# Patient Record
(demographics unavailable — no encounter records)

---

## 2025-02-03 NOTE — ASSESSMENT
[FreeTextEntry1] : 81F hx JAK2+ ET, refractory platelet count to hydroxyurea.  Doses could not be titrated up to effect due to side effects of nausea or vomiting. After discussion of her various options, we had elected to start anagrelide 0.5mg BID. However, before long, patient had considered that the hydrea was easier to take for her after all, switched back to Hydrea 500mg BID then increased this to 1000mg/500mg (alternates every day) due to lack of response. Her worsening leukocytosis and anemia along with her persistent pruritis / deep burning sensation are suspicious for post-ET myelofibrosis.  Will schedule patient for bone marrow biopsy and aspiration as soon as possible to evaluate for myelofibrosis. Continue with the current regimen of alternating hydroxyurea 500mg qd / 1000 mg qd for now.  RTC 1 month to discuss results of BMBx / aspiration.  Patient seen and examined with Dr. Rush.   Adi Cadena M.D. Hematology/Oncology Fellow PGY-6 [facility]  I discussed this patient in a pre-clinic session with the fellow including review of clinical status and last labs.  I also saw the patient and discussed history, completed an exam and discussed the plan together with the fellow.  Total time spent today on this patient   36   minutes.  This is an 81 year old women with a history of ET, Hg decreasing despite good platelet control for some time now, WBC elevated Suspect progression to  myelofibrosis. Recommend bone marrow biopsy continue  undue hydrea 1000/500mg alternating doses for now.

## 2025-02-03 NOTE — HISTORY OF PRESENT ILLNESS
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 Initially seen by Dr. Ofe Ricci followed by Dr. Gibbons.   in [place]. Patient referred back to [facility] for 2nd opinion on ET.  Patient had difficulty maintaining cytoreduction on either hydroxyurea or anagrelide by history.  Platelets and Hg well controlled when she is on the medications. Patient was lost to follow up at the end of 2022.  Returned to his PCP office this past week.   WBC was noted to be 35k/uL platelets in the 560's.  MCV 92 suggesting patient was not taking  the hydroxyurea 1500mg daily.  Patient reported to Dr. Roper that she was having increasingly severe pruritus, particularly while taking a shower.  I had called patient on that week explaining that she needs to follow up. in person given the WBC, needed to review for transition to MF or leukemia.   Patient did claim to take the Hydrea the whole time, however I pointed out that I do have evidence that she had not given the lack of increase in MCV.  We know by history that it goes into the 120's in MCV when she is indeed taking it.   [de-identified] : Following up for JAK2+ ET. She reports an intermittent burning sensation that is much deeper than the skin. She has been taking hydroxyurea 500mg alternating 1 tab qd and 1 tab BID. She was already taking ASA 81mg qd well before developing symptoms of ET. Today 1/30/25 WBC 24.28, Hgb 10.8, Plt 501.

## 2025-02-03 NOTE — REVIEW OF SYSTEMS
[Diarrhea: Grade 0] : Diarrhea: Grade 0 [Negative] : Allergic/Immunologic [de-identified] : Intermittent diffuse deep burning sensation (not superficial)

## 2025-02-03 NOTE — ASSESSMENT
[FreeTextEntry1] : 81F hx JAK2+ ET, refractory platelet count to hydroxyurea.  Doses could not be titrated up to effect due to side effects of nausea or vomiting. After discussion of her various options, we had elected to start anagrelide 0.5mg BID. However, before long, patient had considered that the hydrea was easier to take for her after all, switched back to Hydrea 500mg BID then increased this to 1000mg/500mg (alternates every day) due to lack of response. Her worsening leukocytosis and anemia along with her persistent pruritis / deep burning sensation are suspicious for post-ET myelofibrosis.  Will schedule patient for bone marrow biopsy and aspiration as soon as possible to evaluate for myelofibrosis. Continue with the current regimen of alternating hydroxyurea 500mg qd / 1000 mg qd for now.  RTC 1 month to discuss results of BMBx / aspiration.  Patient seen and examined with Dr. Rush.   Adi Cadena M.D. Hematology/Oncology Fellow PGY-6 [facility]  I discussed this patient in a pre-clinic session with the fellow including review of clinical status and last labs.  I also saw the patient and discussed history, completed an exam and discussed the plan together with the fellow.  Total time spent today on this patient   36   minutes.  This is an 81 year old women with a history of ET, Hg decreasing despite good platelet control for some time now, WBC elevated Suspect progression to  myelofibrosis. Recommend bone marrow biopsy continue  undue hydrea 1000/500mg alternating doses for now.

## 2025-02-03 NOTE — REVIEW OF SYSTEMS
[Diarrhea: Grade 0] : Diarrhea: Grade 0 [Negative] : Allergic/Immunologic [de-identified] : Intermittent diffuse deep burning sensation (not superficial)

## 2025-02-03 NOTE — HISTORY OF PRESENT ILLNESS
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 Initially seen by Dr. Ofe Ricci followed by Dr. Gibbons.   in [place]. Patient referred back to [facility] for 2nd opinion on ET.  Patient had difficulty maintaining cytoreduction on either hydroxyurea or anagrelide by history.  Platelets and Hg well controlled when she is on the medications. Patient was lost to follow up at the end of 2022.  Returned to his PCP office this past week.   WBC was noted to be 35k/uL platelets in the 560's.  MCV 92 suggesting patient was not taking  the hydroxyurea 1500mg daily.  Patient reported to Dr. Roper that she was having increasingly severe pruritus, particularly while taking a shower.  I had called patient on that week explaining that she needs to follow up. in person given the WBC, needed to review for transition to MF or leukemia.   Patient did claim to take the Hydrea the whole time, however I pointed out that I do have evidence that she had not given the lack of increase in MCV.  We know by history that it goes into the 120's in MCV when she is indeed taking it.   [de-identified] : Following up for JAK2+ ET. She reports an intermittent burning sensation that is much deeper than the skin. She has been taking hydroxyurea 500mg alternating 1 tab qd and 1 tab BID. She was already taking ASA 81mg qd well before developing symptoms of ET. Today 1/30/25 WBC 24.28, Hgb 10.8, Plt 501.

## 2025-02-03 NOTE — ASSESSMENT
[FreeTextEntry1] : 81F hx JAK2+ ET, refractory platelet count to hydroxyurea.  Doses could not be titrated up to effect due to side effects of nausea or vomiting. After discussion of her various options, we had elected to start anagrelide 0.5mg BID. However, before long, patient had considered that the hydrea was easier to take for her after all, switched back to Hydrea 500mg BID then increased this to 1000mg/500mg (alternates every day) due to lack of response. Her worsening leukocytosis and anemia along with her persistent pruritis / deep burning sensation are suspicious for post-ET myelofibrosis.  Will schedule patient for bone marrow biopsy and aspiration as soon as possible to evaluate for myelofibrosis. Continue with the current regimen of alternating hydroxyurea 500mg qd / 1000 mg qd for now.  RTC 1 month to discuss results of BMBx / aspiration.  Patient seen and examined with Dr. Rush.   Adi Cadena M.D. Hematology/Oncology Fellow PGY-6 [facility]  I discussed this patient in a pre-clinic session with the fellow including review of clinical status and last labs.  I also saw the patient and discussed history, completed an exam and discussed the plan together with the fellow.  Total time spent today on this patient   36   minutes.  This is an 81 year old women with a history of ET, Hg decreasing despite good platelet control for some time now, WBC elevated Suspect progression to  myelofibrosis. Recommend bone marrow biopsy continue  undue hydrea 1000/500mg alternating doses for now.

## 2025-02-03 NOTE — HISTORY OF PRESENT ILLNESS
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 Initially seen by Dr. Ofe Ricci followed by Dr. Gibbons.   in [place]. Patient referred back to [facility] for 2nd opinion on ET.  Patient had difficulty maintaining cytoreduction on either hydroxyurea or anagrelide by history.  Platelets and Hg well controlled when she is on the medications. Patient was lost to follow up at the end of 2022.  Returned to his PCP office this past week.   WBC was noted to be 35k/uL platelets in the 560's.  MCV 92 suggesting patient was not taking  the hydroxyurea 1500mg daily.  Patient reported to Dr. Roper that she was having increasingly severe pruritus, particularly while taking a shower.  I had called patient on that week explaining that she needs to follow up. in person given the WBC, needed to review for transition to MF or leukemia.   Patient did claim to take the Hydrea the whole time, however I pointed out that I do have evidence that she had not given the lack of increase in MCV.  We know by history that it goes into the 120's in MCV when she is indeed taking it.   [de-identified] : Following up for JAK2+ ET. She reports an intermittent burning sensation that is much deeper than the skin. She has been taking hydroxyurea 500mg alternating 1 tab qd and 1 tab BID. She was already taking ASA 81mg qd well before developing symptoms of ET. Today 1/30/25 WBC 24.28, Hgb 10.8, Plt 501.

## 2025-02-03 NOTE — REVIEW OF SYSTEMS
[Diarrhea: Grade 0] : Diarrhea: Grade 0 [Negative] : Allergic/Immunologic [de-identified] : Intermittent diffuse deep burning sensation (not superficial)

## 2025-02-06 NOTE — REASON FOR VISIT
[Bone Marrow Biopsy] : bone marrow biopsy [Bone Marrow Aspiration] : bone marrow aspiration [Family Member] : family member [FreeTextEntry2] : R/O Myelofibrosis

## 2025-02-06 NOTE — PROCEDURE
[Bone Marrow Biopsy] : bone marrow biopsy [Bone Marrow Aspiration] : bone marrow aspiration  [Patient] : the patient [Correct positioning] : correct positioning [Prone] : prone [The left posterior iliac crest was prepped with betadine and draped, using sterile technique.] : The left posterior iliac crest was prepped with betadine and draped, using sterile technique. [Lidocaine was injected and into the periosteum overlying the site.] : Lidocaine was injected and into the periosteum overlying the site. [Aspirate] : aspirate [Cytogenetics] : cytogenetics [FISH] : FISH [Other ___] : [unfilled] [Biopsy] : biopsy [Flow Cytometry] : flow cytometry [] : The patient was instructed to remove the bandage the following AM. The patient may bathe. Acetaminophen may be taken for discomfort, as per package directions.If there are any other problems, the patient was instructed to call the office. The patient verbalized understanding, and is aware of the office contact numbers. [FreeTextEntry1] : R/O Myelofibrosis [FreeTextEntry2] : WBC 27.9 Hgb 10.9 Hct 33.5 Plts 314K  BM biopsy and aspirate done.  Samples sent to [facility].  Lidocaine-2 %-6 cc's used for local anesthesia.

## 2025-03-09 NOTE — HISTORY OF PRESENT ILLNESS
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 Initially seen by Dr. Ofe Ricci followed by Dr. Gibbons.   in [place]. Patient referred back to [facility] for 2nd opinion on ET.  Patient had difficulty maintaining cytoreduction on either hydroxyurea or anagrelide by history.  Platelets and Hg well controlled when she is on the medications. Patient was lost to follow up at the end of 2022.  Returned to his PCP office this past week.   WBC was noted to be 35k/uL platelets in the 560's.  MCV 92 suggesting patient was not taking  the hydroxyurea 1500mg daily.  Patient reported to Dr. Roper that she was having increasingly severe pruritus, particularly while taking a shower.  I had called patient on that week explaining that she needs to follow up. in person given the WBC, needed to review for transition to MF or leukemia.   Patient did claim to take the Hydrea the whole time, however I pointed out that I do have evidence that she had not given the lack of increase in MCV.  We know by history that it goes into the 120's in MCV when she is indeed taking it.   [de-identified] : Following up for JAK2+ ET. She reports an intermittent burning sensation that is much deeper than the skin. She has been taking hydroxyurea 500mg alternating 1 tab qd and 1 tab BID. She was already taking ASA 81mg qd well before developing symptoms of ET. Today 3/6/25 CBC: WBC: 26.92, Hgb 11.0, .   (3) walks occasionally

## 2025-03-09 NOTE — REVIEW OF SYSTEMS
[Diarrhea: Grade 0] : Diarrhea: Grade 0 [Negative] : Allergic/Immunologic [Night Sweats] : night sweats [Recent Change In Weight] : ~T recent weight change [de-identified] : +intense pruritus.  [de-identified] : Intermittent diffuse deep burning sensation (not superficial)

## 2025-03-09 NOTE — REVIEW OF SYSTEMS
[Diarrhea: Grade 0] : Diarrhea: Grade 0 [Negative] : Allergic/Immunologic [Night Sweats] : night sweats [Recent Change In Weight] : ~T recent weight change [de-identified] : +intense pruritus.  [de-identified] : Intermittent diffuse deep burning sensation (not superficial)

## 2025-03-09 NOTE — ASSESSMENT
[FreeTextEntry1] : 81F hx JAK2+ ET, refractory platelet count to hydroxyurea.  Doses could not be titrated up to effect due to side effects of nausea or vomiting. After discussion of her various options, we had elected to start anagrelide 0.5mg BID. However, before long, patient had considered that the hydrea was easier to take for her after all, switched back to Hydrea 500mg BID then increased this to 1000mg/500mg (alternates every day) due to lack of response. Her worsening leukocytosis and anemia along with her persistent pruritis / deep burning sensation are suspicious for post-ET myelofibrosis. Bone Marrow biopsy from 2/6/25 show MF grade 2-3. Patient has constitutional symptoms of early satiety. Will start the patient on fedratinib BID (patient to be prescribed BID but will start qdaily for the first month and slowly uptitrate to BID) while continuing with the current regimen of alternating hydroxyurea 500mg qd / 1000 mg qd for now. Will f/u in 1 month, if counts are stable at that time we will consider tapering off of the hydrea slowly while checking cbcs and lfts monthly.     Patient seen and examined with Dr. Rush.   Dakotah Elliott MD. PGY-V Hematology Oncology Fellow   I discussed this patient in a pre-clinic session with the fellow including review of clinical status and last labs.  I also saw the patient and discussed history, completed an exam and discussed the plan together with the fellow.  Total time spent today on this patient   37   minutes.  This i an 81 year old woman with a history of ET, history of elevated platelet counts that have since been well controlled after a period of poor compliance prior to 2021 when we had started seeing her.  Patient has been mostly well controlled since April 2021.  Most recently we have seen her hg decrease to 10.9-11.0g/dkl accompanied by increased itching through December into January 2025. This is an entirely new finding.  Suspected patient had progressed to Myelofibrosis without splenomegaly.  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  Recommend patient start Fedrapinib 100mg 2 tablets daily for vasomotor symptoms of the myelofibrosis.  CBC stable right now, apers the effects are mostly vasomotor with pruritis, anemia is asymptmatic.

## 2025-03-09 NOTE — HISTORY OF PRESENT ILLNESS
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 Initially seen by Dr. Ofe Ricci followed by Dr. Gibbons.   in [place]. Patient referred back to [facility] for 2nd opinion on ET.  Patient had difficulty maintaining cytoreduction on either hydroxyurea or anagrelide by history.  Platelets and Hg well controlled when she is on the medications. Patient was lost to follow up at the end of 2022.  Returned to his PCP office this past week.   WBC was noted to be 35k/uL platelets in the 560's.  MCV 92 suggesting patient was not taking  the hydroxyurea 1500mg daily.  Patient reported to Dr. Roper that she was having increasingly severe pruritus, particularly while taking a shower.  I had called patient on that week explaining that she needs to follow up. in person given the WBC, needed to review for transition to MF or leukemia.   Patient did claim to take the Hydrea the whole time, however I pointed out that I do have evidence that she had not given the lack of increase in MCV.  We know by history that it goes into the 120's in MCV when she is indeed taking it.   [de-identified] : Following up for JAK2+ ET. She reports an intermittent burning sensation that is much deeper than the skin. She has been taking hydroxyurea 500mg alternating 1 tab qd and 1 tab BID. She was already taking ASA 81mg qd well before developing symptoms of ET. Today 3/6/25 CBC: WBC: 26.92, Hgb 11.0, .

## 2025-04-15 NOTE — HISTORY OF PRESENT ILLNESS
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 Initially seen by Dr. Ofe Ricci followed by Dr. Gibbons.   in [place]. [facility]erred back to [facility] for [facility] opinion on ET.  Patient had difficulty maintaining cytoreduction on either hydroxyurea or anagrelide by history.  Platelets and Hg well controlled when she is on the medications.    Joselito was lost to follow up at the end of 2022.  Returned to his PCP office this past week.   WBC was noted to be 35k/ul platets in the 560's.  MCV 92 suggesting lex was not taking  the hydroxyurea 1500mg daily.  Patient reported to Dr. Roper that she was having increasingly severe pruritus, particularly while taking ia shower.  I had called patint on that week explaining that she needs to follow up. in person given the WBC, needed to review for transition to MF or leukemia.   Lex did claim to take the hydrea the whole time, however I pointed out that I do have evidence that she had not given the lack of increase in MCV.  We know by maryuri that it goes into the 120's in MCV when she is indeed taking it.   [de-identified] : Patient still complains of itching and body pains.  Took 3-4 weeks just to get the federation 100mg daily, but patient did get the medication but she actually believes the itching is worse now.  Has been on the federapinib 100mg for about 3 weeks.    Patient feels burning in the arms and body especially in the shower.  Patient remains on the hydroxyurea.   At the very least the Faverani dopes not hurt the stomach though she notices increased gas.   Hg 10.9g/dl today WBC 15.38.   Platelets 547.    patient sister was using a triamcinolone medication for itching, Patient though she could try it. Would surmise that it probably wouldn't t hurt but this is more for eczema rathe than pruritic from the ET.

## 2025-04-15 NOTE — REVIEW OF SYSTEMS
[Diarrhea: Grade 0] : Diarrhea: Grade 0 [Negative] : Allergic/Immunologic [de-identified] : Pruritis

## 2025-04-15 NOTE — REVIEW OF SYSTEMS
[Diarrhea: Grade 0] : Diarrhea: Grade 0 [Negative] : Allergic/Immunologic [de-identified] : Pruritis

## 2025-04-15 NOTE — HISTORY OF PRESENT ILLNESS
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 Initially seen by Dr. Ofe Ricci followed by Dr. Gibbons.   in [place]. [facility]nreferred back to [facility] for 2[facility] opinion on ET.  Patient had difficulty maintaining cytoreduction on either hydroxyurea or anagrelide by history.  Platelets and Hg well controlled when she is on the medications.    Joselito was lost to follow up at the end of 2022.  Returned to his PCP office this past week.   WBC was noted to be 35k/ul platets in the 560's.  MCV 92 suggesting lex was not taking  the hydroxyurea 1500mg daily.  Patient reported to Dr. Roper that she was having increasingly severe pruritus, particularly while taking ia shower.  I had called patint on that week explaining that she needs to follow up. in person given the WBC, needed to review for transition to MF or leukemia.   Lex did claim to take the hydrea the whole time, however I pointed out that I do have evidence that she had not given the lack of increase in MCV.  We know by maryuri that it goes into the 120's in MCV when she is indeed taking it.   [de-identified] : Patient still complains of itching and body pains.  Took 3-4 weeks just to get the federation 100mg daily, but patient did get the medication but she actually believes the itching is worse now.  Has been on the federapinib 100mg for about 3 weeks.    Patient feels burning in the arms and body especially in the shower.  Patient remains on the hydroxyurea.   At the very least the Faverani dopes not hurt the stomach though she notices increased gas.   Hg 10.9g/dl today WBC 15.38.   Platelets 547.    patient sister was using a triamcinolone medication for itching, Patient though she could try it. Would surmise that it probably wouldn't t hurt but this is more for eczema rathe than pruritic from the ET.

## 2025-04-15 NOTE — ASSESSMENT
[FreeTextEntry1] : 81F hx JAK2+ ET, refractory platelet count to hydroxyurea.  Doses could not be titrated up to effect due to side effects of nausea or vomiting. After discussion of her various options, we had elected to start anagrelide 0.5mg BID. However, before long, patient had considered that the hydrea was easier to take for her after all, switched back to Hydrea 500mg BID then increased this to 1000mg/500mg (alternates every day) due to lack of response. Her worsening leukocytosis and anemia along with her persistent pruritis / deep burning sensation are suspicious for post-ET myelofibrosis. Bone Marrow biopsy from 2/6/25 show MF grade 2-3. Patient has constitutional symptoms of early satiety. Patient on federation which she did receive, however is taking 100mg daily instead of 200mg daily. She experiences pruritic again which is the same complaint that she was complaining of for a while now. She implicates the new fedratinib  in this. Patient requesting a switch back to anagrelide.   I pointed out to patient  that we have gone through multiple rounds of switching from hydrea,  to anagrelide, back to hydrea and then to fedratinib, the entire time she was making the same complaints and attributing them to the medication two whole time.  IN essence her symptoms are from the myelofibrosis itself, however patient always identifies this as a side effect of the medications he was on and we have switched back and fourth many times without any relief of her symptoms. Recommend strongly that she should take the fedratpinib 200mg daily. Patient was able to express this back to me and repeat this back. She was never really complaint with any of the above medications recommended in the past therefore its more of a compliance issue rather than a choice of medications issue.    Will follow up in 1 month.  Spent > 40 minutes in direct patient care and addressed all questions and concerns.  >50% of this time was in direct face to face contact with patient during exam and counseling.

## 2025-05-11 NOTE — ASSESSMENT
[FreeTextEntry1] : 81F hx JAK2+ ET, refractory platelet count to hydroxyurea.  Doses could not be titrated up to effect due to side effects of nausea or vomiting. After discussion of her various options, we had elected to start anagrelide 0.5mg BID. However, before long, patient had considered that the hydrea was easier to take for her after all, switched back to Hydrea 500mg BID then increased this to 1000mg/500mg (alternates every day) due to lack of response. Her worsening leukocytosis and anemia along with her persistent pruritis / deep burning sensation are suspicious for post-ET myelofibrosis. Bone Marrow biopsy from 2/6/25 show MF grade 2-3. Patient has constitutional symptoms of early satiety. Patient on federation which she did receive, however is taking 100mg daily instead of 200mg daily. She experiences pruritic again which is the same complaint that she was complaining of for a while now. She implicates the new fedratinib  in this. Patient requesting a switch back to anagrelide.   She is now adherent to fedratinib 200 mg qd but is becoming anemic to Hgb 8.3 g/dL due to the fedratinib. Plt 267 K/uL today, so will discontinue hydroxyurea.  RTC 1 month to monitor anemia closely.  Patient seen and examined with Dr. Rush.   Adi Cadena M.D. Hematology/Oncology Fellow PGY-6 [facility]  I discussed this patient in a pre-clinic session with the fellow including review of clinical status and last labs.  I also saw the patient and discussed history, completed an exam and discussed the plan together with the fellow.  Total time spent today on this patient  35    minutes.  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 HG starting to come down tiwh t hydrea nad federapinib. recocmedn discontinue the hydrea, contineu fedrapinib 100mg 2 tablets daily, follow up in 1 months for anemia.

## 2025-05-11 NOTE — HISTORY OF PRESENT ILLNESS
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 Initially seen by Dr. Ofe Ricci followed by Dr. Gibbons.   in [place]. [facility]nreferred back to [facility] for 2nd opinion on ET.  Patient had difficulty maintaining cytoreduction on either hydroxyurea or anagrelide by history.  Platelets and Hg well controlled when she is on the medications.    Joselito was lost to follow up at the end of 2022.  Returned to his PCP office this past week.   WBC was noted to be 35k/ul platets in the 560's.  MCV 92 suggesting lex was not taking  the hydroxyurea 1500mg daily.  Patient reported to Dr. Roper that she was having increasingly severe pruritus, particularly while taking ia shower.  I had called patint on that week explaining that she needs to follow up. in person given the WBC, needed to review for transition to MF or leukemia.   Lex did claim to take the hydrea the whole time, however I pointed out that I do have evidence that she had not given the lack of increase in MCV.  We know by maryuri that it goes into the 120's in MCV when she is indeed taking it.   [de-identified] : Following up for post-ET MF. Patient still complains of itching and muscle cramps. Took 3-4 weeks just to get the fedratinib 200 mg daily. Patient remains on the hydroxyurea. Hgb 8.3 g/dL and platelets 267 K/uL.  Pruritis is 90% better after starting the fedratinib. Hgb 9.9 g/dL though, probably treatment related. Given fedratinib helped her pruritis, will stop the hydroxyurea first before decreasing fedratinib. Follow up in 1 month for anemia.   Does notice cramping, check BMP and magnesium.    Patient did notice some shakiness and weakness, suspect its anemia related and should recover by stopping the hydroxyurea.

## 2025-05-11 NOTE — HISTORY OF PRESENT ILLNESS
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 Initially seen by Dr. Ofe Ricci followed by Dr. Gibbons.   in [place] Michaelnreferred back to [facility] for 2nd opinion on ET.  Patient had difficulty maintaining cytoreduction on either hydroxyurea or anagrelide by history.  Platelets and Hg well controlled when she is on the medications.    Joselito was lost to follow up at the end of 2022.  Returned to his PCP office this past week.   WBC was noted to be 35k/ul platets in the 560's.  MCV 92 suggesting lex was not taking  the hydroxyurea 1500mg daily.  Patient reported to Dr. Roper that she was having increasingly severe pruritus, particularly while taking ia shower.  I had called patint on that week explaining that she needs to follow up. in person given the WBC, needed to review for transition to MF or leukemia.   Lex did claim to take the hydrea the whole time, however I pointed out that I do have evidence that she had not given the lack of increase in MCV.  We know by maryuri that it goes into the 120's in MCV when she is indeed taking it.   [de-identified] : Following up for post-ET MF. Patient still complains of itching and muscle cramps. Took 3-4 weeks just to get the fedratinib 200 mg daily. Patient remains on the hydroxyurea. Hgb 8.3 g/dL and platelets 267 K/uL.  Pruritis is 90% better after starting the fedratinib. Hgb 9.9 g/dL though, probably treatment related. Given fedratinib helped her pruritis, will stop the hydroxyurea first before decreasing fedratinib. Follow up in 1 month for anemia.   Does notice cramping, check BMP and magnesium.    Patient did notice some shakiness and weakness, suspect its anemia related and should recover by stopping the hydroxyurea.

## 2025-05-11 NOTE — REVIEW OF SYSTEMS
[Diarrhea: Grade 0] : Diarrhea: Grade 0 [Negative] : Allergic/Immunologic [de-identified] : Pruritis

## 2025-05-11 NOTE — HISTORY OF PRESENT ILLNESS
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 Initially seen by Dr. Ofe Ricci followed by Dr. Gibbons.   in [place]. Michaelnreferred back to [facility] for 2nd opinion on ET.  Patient had difficulty maintaining cytoreduction on either hydroxyurea or anagrelide by history.  Platelets and Hg well controlled when she is on the medications.    Joselito was lost to follow up at the end of 2022.  Returned to his PCP office this past week.   WBC was noted to be 35k/ul platets in the 560's.  MCV 92 suggesting lex was not taking  the hydroxyurea 1500mg daily.  Patient reported to Dr. Roper that she was having increasingly severe pruritus, particularly while taking ia shower.  I had called patint on that week explaining that she needs to follow up. in person given the WBC, needed to review for transition to MF or leukemia.   Lex did claim to take the hydrea the whole time, however I pointed out that I do have evidence that she had not given the lack of increase in MCV.  We know by maryuri that it goes into the 120's in MCV when she is indeed taking it.   [de-identified] : Following up for post-ET MF. Patient still complains of itching and muscle cramps. Took 3-4 weeks just to get the fedratinib 200 mg daily. Patient remains on the hydroxyurea. Hgb 8.3 g/dL and platelets 267 K/uL.  Pruritis is 90% better after starting the fedratinib. Hgb 9.9 g/dL though, probably treatment related. Given fedratinib helped her pruritis, will stop the hydroxyurea first before decreasing fedratinib. Follow up in 1 month for anemia.   Does notice cramping, check BMP and magnesium.    Patient did notice some shakiness and weakness, suspect its anemia related and should recover by stopping the hydroxyurea.

## 2025-05-11 NOTE — REVIEW OF SYSTEMS
[Diarrhea: Grade 0] : Diarrhea: Grade 0 [Negative] : Allergic/Immunologic [de-identified] : Pruritis

## 2025-05-11 NOTE — ASSESSMENT
Isatu Nelson was seen and treated in our emergency department on 8/4/2022. He may return to work on 08/08/2022. Feng Carpio was originally seen on 8/2 for an abscess to his leg. He needed to return the emergency department on 8/4 for wound reevaluation. Recommending time off until Monday, 8th. If you have any questions or concerns, please don't hesitate to call.       Moraima Laws MD [FreeTextEntry1] : 81F hx JAK2+ ET, refractory platelet count to hydroxyurea.  Doses could not be titrated up to effect due to side effects of nausea or vomiting. After discussion of her various options, we had elected to start anagrelide 0.5mg BID. However, before long, patient had considered that the hydrea was easier to take for her after all, switched back to Hydrea 500mg BID then increased this to 1000mg/500mg (alternates every day) due to lack of response. Her worsening leukocytosis and anemia along with her persistent pruritis / deep burning sensation are suspicious for post-ET myelofibrosis. Bone Marrow biopsy from 2/6/25 show MF grade 2-3. Patient has constitutional symptoms of early satiety. Patient on federation which she did receive, however is taking 100mg daily instead of 200mg daily. She experiences pruritic again which is the same complaint that she was complaining of for a while now. She implicates the new fedratinib  in this. Patient requesting a switch back to anagrelide.   She is now adherent to fedratinib 200 mg qd but is becoming anemic to Hgb 8.3 g/dL due to the fedratinib. Plt 267 K/uL today, so will discontinue hydroxyurea.  RTC 1 month to monitor anemia closely.  Patient seen and examined with Dr. Rush.   Adi Cadena M.D. Hematology/Oncology Fellow PGY-6 [facility]  I discussed this patient in a pre-clinic session with the fellow including review of clinical status and last labs.  I also saw the patient and discussed history, completed an exam and discussed the plan together with the fellow.  Total time spent today on this patient  35    minutes.  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 HG starting to come down tiwh t hydrea nad federapinib. recocmedn discontinue the hydrea, contineu fedrapinib 100mg 2 tablets daily, follow up in 1 months for anemia.

## 2025-05-11 NOTE — REVIEW OF SYSTEMS
[Diarrhea: Grade 0] : Diarrhea: Grade 0 [Negative] : Allergic/Immunologic [de-identified] : Pruritis

## 2025-05-11 NOTE — ASSESSMENT
[FreeTextEntry1] : 81F hx JAK2+ ET, refractory platelet count to hydroxyurea.  Doses could not be titrated up to effect due to side effects of nausea or vomiting. After discussion of her various options, we had elected to start anagrelide 0.5mg BID. However, before long, patient had considered that the hydrea was easier to take for her after all, switched back to Hydrea 500mg BID then increased this to 1000mg/500mg (alternates every day) due to lack of response. Her worsening leukocytosis and anemia along with her persistent pruritis / deep burning sensation are suspicious for post-ET myelofibrosis. Bone Marrow biopsy from 2/6/25 show MF grade 2-3. Patient has constitutional symptoms of early satiety. Patient on federation which she did receive, however is taking 100mg daily instead of 200mg daily. She experiences pruritic again which is the same complaint that she was complaining of for a while now. She implicates the new fedratinib  in this. Patient requesting a switch back to anagrelide.   She is now adherent to fedratinib 200 mg qd but is becoming anemic to Hgb 8.3 g/dL due to the fedratinib. Plt 267 K/uL today, so will discontinue hydroxyurea.  RTC 1 month to monitor anemia closely.  Patient seen and examined with Dr. Rush.   Adi Cadena M.D. Hematology/Oncology Fellow PGY-6 [facility]  I discussed this patient in a pre-clinic session with the fellow including review of clinical status and last labs.  I also saw the patient and discussed history, completed an exam and discussed the plan together with the fellow.  Total time spent today on this patient  35    minutes.  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 HG starting to come down tiwh t hydrea nad federapinib. recocmedn discontinue the hydrea, contineu fedrapinib 100mg 2 tablets daily, follow up in 1 months for anemia.

## 2025-06-12 NOTE — HISTORY OF PRESENT ILLNESS
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 Initially seen by Dr. Ofe Ricci followed by Dr. Gibbons.   in [place]. Michaelnreferred back to [facility] for 2nd opinion on ET.  Patient had difficulty maintaining cytoreduction on either hydroxyurea or anagrelide by history.  Platelets and Hg well controlled when she is on the medications.    Joselito was lost to follow up at the end of 2022.  Returned to his PCP office this past week.   WBC was noted to be 35k/ul platets in the 560's.  MCV 92 suggesting lex was not taking  the hydroxyurea 1500mg daily.  Patient reported to Dr. Roper that she was having increasingly severe pruritus, particularly while taking ia shower.  I had called patint on that week explaining that she needs to follow up. in person given the WBC, needed to review for transition to MF or leukemia.   Lex did claim to take the hydrea the whole time, however I pointed out that I do have evidence that she had not given the lack of increase in MCV.  We know by maryuri that it goes into the 120's in MCV when she is indeed taking it.   [de-identified] : Following up for post-ET MF. Patient was taking 1 tablet a day, inching stopped but patient tolerated it well, this was increased to 2 tablets daily. Patient ended up having lack of energy, and sever itching.   Patient started 2 tablets daily, and then felt very ill, felt like a nuclear bomb went off in her body .   Had rashes on the face but does not have pictures of this.   After taking the inrebic, patient  had shaking and chills, severe cramping, difficulty walking, felt out of balance.  and lack of energy.  patient denies confusion and denied blurry vision.   After discussing this with patient , patient had decided to call into her niece who wanted to listen in to the conversation which we re-reviewed, but there were connection issues that prevented niece from hearing most of the conversation.   Spent another 3-4 attempts to recondite patient's cell phone to daughter and then another 14 attempts to call patient back on her [place] number [phone] however could totconnect because then ice kept trying to call us instead.  In any case gave patient my personal cell [facility] number.  befog We ware alit to reckoned with the niece on the 17th attempt.   Discussed using a Benadryl topical for their itching though this effect is mild.  patient was reacceded to start gabapentin due to niece having some friends that she knew that were on gabapentin with some relief.    12:54 Initiated encounter.  13:37Ended encounter.

## 2025-06-12 NOTE — REVIEW OF SYSTEMS
[Diarrhea: Grade 0] : Diarrhea: Grade 0 [Negative] : Allergic/Immunologic [de-identified] : Pruritis

## 2025-06-12 NOTE — ASSESSMENT
[FreeTextEntry1] : 81F hx JAK2+ ET, refractory platelet count to hydroxyurea.  Doses could not be titrated up to effect due to side effects of nausea or vomiting. After discussion of her various options, we had elected to start anagrelide 0.5mg BID. However, before long, patient had considered that the hydrea was easier to take for her after all, switched back to Hydrea 500mg BID then increased this to 1000mg/500mg (alternates every day) due to lack of response. Her worsening leukocytosis and anemia along with her persistent pruritis / deep burning sensation are suspicious for post-ET myelofibrosis. Bone Marrow biopsy from 2/6/25 show MF grade 2-3. Patient has constitutional symptoms of early satiety. Patient on federation which she did receive, however is taking 100mg daily . 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    Recommended patient start thiamine 100mg daily for a week before  restarting low dose inrebic 100mg daily.   Recheck Thiamine level.   Patient will eventually need some form of EMMANUELLE inhibition to relieve symptoms. Will try to get her the best dose she can tolerate.    Discussed attempting the use of a low dose  gabapentin  100mg BID to help with the pruritic.  Explained that this may or may not help but the lower doses are associated with few side effects but it is worth a try.    Remain off hydroxyurea.  follow up in 1 month..

## 2025-07-29 NOTE — HISTORY OF PRESENT ILLNESS
[de-identified] : Patient notices some itching. was able to  get the inrebic medication adn start thiamine 100mg daily.  Patient feels that the thiamine is helping mor than the inrebic.   but states that the medication makes her feel unwel. Causes foot cramping.  Last dose of inrebic as takin this past Monday before she decided to stop it.  7/21 On the last visit, jefe requested gabapentin for the pruritis, but admits shes not taking it every day beccause they make her sleepty.   IT does seem to help the pruritis though.  Patient is off the hydrea as instructed.    Inquries about folic acid.  LEvel was 8.8 does not need the folci acid, no defeicny.   Unable to make a clinical decisipon, CBC machine seems to be down today 11:31 Re-started encounter.an call her back to complete the visit.

## 2025-07-29 NOTE — HISTORY OF PRESENT ILLNESS
[de-identified] : Patient notices some itching. was able to  get the inrebic medication adn start thiamine 100mg daily.  Patient feels that the thiamine is helping mor than the inrebic.   but states that the medication makes her feel unwel. Causes foot cramping.  Last dose of inrebic as takin this past Monday before she decided to stop it.  7/21 On the last visit, jefe requested gabapentin for the pruritis, but admits shes not taking it every day beccause they make her sleepty.   IT does seem to help the pruritis though.  Patient is off the hydrea as instructed.    Inquries about folic acid.  LEvel was 8.8 does not need the folci acid, no defeicny.   Unable to make a clinical decisipon, CBC machine seems to be down today 11:31 Re-started encounter.an call her back to complete the visit.